# Patient Record
Sex: FEMALE | Race: WHITE | NOT HISPANIC OR LATINO | URBAN - METROPOLITAN AREA
[De-identification: names, ages, dates, MRNs, and addresses within clinical notes are randomized per-mention and may not be internally consistent; named-entity substitution may affect disease eponyms.]

---

## 2019-09-27 ENCOUNTER — EMERGENCY (EMERGENCY)
Facility: HOSPITAL | Age: 74
LOS: 0 days | Discharge: HOME | End: 2019-09-27
Attending: EMERGENCY MEDICINE
Payer: MEDICARE

## 2019-09-27 VITALS
TEMPERATURE: 98 F | HEART RATE: 73 BPM | RESPIRATION RATE: 18 BRPM | SYSTOLIC BLOOD PRESSURE: 172 MMHG | DIASTOLIC BLOOD PRESSURE: 84 MMHG | OXYGEN SATURATION: 96 %

## 2019-09-27 VITALS — HEIGHT: 65 IN | WEIGHT: 141.1 LBS

## 2019-09-27 DIAGNOSIS — S00.511A ABRASION OF LIP, INITIAL ENCOUNTER: ICD-10-CM
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
## 2019-09-27 DIAGNOSIS — S00.81XA ABRASION OF OTHER PART OF HEAD, INITIAL ENCOUNTER: ICD-10-CM

## 2019-09-27 DIAGNOSIS — S80.01XA CONTUSION OF RIGHT KNEE, INITIAL ENCOUNTER: ICD-10-CM

## 2019-09-27 DIAGNOSIS — S02.5XXA FRACTURE OF TOOTH (TRAUMATIC), INITIAL ENCOUNTER FOR CLOSED FRACTURE: ICD-10-CM

## 2019-09-27 DIAGNOSIS — I10 ESSENTIAL (PRIMARY) HYPERTENSION: ICD-10-CM
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
## 2019-09-27 DIAGNOSIS — K21.9 GASTRO-ESOPHAGEAL REFLUX DISEASE WITHOUT ESOPHAGITIS: ICD-10-CM

## 2019-09-27 PROCEDURE — 99283 EMERGENCY DEPT VISIT LOW MDM: CPT

## 2019-09-27 PROCEDURE — 90471 IMMUNIZATION ADMIN: CPT

## 2019-09-27 PROCEDURE — 90715 TDAP VACCINE 7 YRS/> IM: CPT

## 2019-09-27 PROCEDURE — 99283 EMERGENCY DEPT VISIT LOW MDM: CPT | Mod: 25

## 2019-09-27 RX ORDER — IBUPROFEN 200 MG
600 TABLET ORAL ONCE
Refills: 0 | Status: COMPLETED | OUTPATIENT
Start: 2019-09-27 | End: 2019-09-27

## 2019-09-27 RX ORDER — TETANUS TOXOID, REDUCED DIPHTHERIA TOXOID AND ACELLULAR PERTUSSIS VACCINE, ADSORBED 5; 2.5; 8; 8; 2.5 [IU]/.5ML; [IU]/.5ML; UG/.5ML; UG/.5ML; UG/.5ML
0.5 SUSPENSION INTRAMUSCULAR ONCE
Refills: 0 | Status: COMPLETED | OUTPATIENT
Start: 2019-09-27 | End: 2019-09-27

## 2019-09-27 RX ADMIN — TETANUS TOXOID, REDUCED DIPHTHERIA TOXOID AND ACELLULAR PERTUSSIS VACCINE, ADSORBED 0.5 MILLILITER(S): 5; 2.5; 8; 8; 2.5 SUSPENSION INTRAMUSCULAR at 09:37

## 2019-09-27 NOTE — ED PROVIDER NOTE - SKIN, MLM
Skin normal color for race, warm, dry and intact. No evidence of rash. +abrasion to chin. +abrasion to inside of lower lip with swelling. +chip of L front tooth, not exposing pulp. +mild swelling of nose distally. +mild contusion to R knee with FROM and weight bearing.

## 2019-09-27 NOTE — ED ADULT TRIAGE NOTE - CHIEF COMPLAINT QUOTE
Pt. to the ED C/O Fall. Pt. states she tripped and fell and landed on face- Pt. denies head and chest injuries- Pt. also denies LOC - GSC of 15 -Denies Blood thinners and or Aspirin- Abrasions to chin and lower lip noted- Chipped Front Maxillary teeth also noted- Denies medical hx- Pt. does not meet Trauma Alert Criteria at this time-

## 2019-09-27 NOTE — ED ADULT NURSE NOTE - CAS EDN DISCHARGE ASSESSMENT
Alert and oriented to person, place and time/Awake/Patient baseline mental status/No adverse reaction to first time med in ED/Symptoms improved

## 2019-09-27 NOTE — ED PROVIDER NOTE - PATIENT PORTAL LINK FT
You can access the FollowMyHealth Patient Portal offered by Health system by registering at the following website: [email or web address]. By joining [facility]’s FollowMyHealth portal, you will also be able to view your health information using other applications (apps) compatible with our system.

## 2019-09-27 NOTE — ED PROVIDER NOTE - OBJECTIVE STATEMENT
73 y/o F with PMHx of HTN, HLD presenting to the ED s/p fall. States she tripped and fell landing on her face. Pt now in ED with abrasions to lower lip, chin, and chipped maxillary teeth.   Denies LOC, N/V, HA,   Pt is not anticoagulated. 73 y/o F with PMHx of HTN, HLD presenting to the ED s/p fall ~10 minutes PTA. States she was walking when she tripped and fell landing on her face. States she  put her hands out in front of her to catch herself, but does not have any hand pain. Pt now in ED with pain to lower face, abrasions to lower lip, chin, and chipped maxillary teeth. Pt also notes R knee and nose pain. Denies LOC, dizziness, N/V, HA, or neck pain. Unknown Tetanus status. Pt is not anticoagulated.

## 2019-09-27 NOTE — ED PROVIDER NOTE - NSFOLLOWUPINSTRUCTIONS_ED_ALL_ED_FT
Dentist:   Follow up with your own dentist or Dr.Neil Birch [phone]     Fall Prevention    WHAT YOU NEED TO KNOW:    Fall prevention includes ways to make your home and other areas safer. It also includes ways you can move more carefully to prevent a fall. Health conditions that cause changes in your blood pressure, vision, or muscle strength and coordination may increase your risk for falls. Medicines may also increase your risk for falls if they make you dizzy, weak, or sleepy.     DISCHARGE INSTRUCTIONS:    Call 911 or have someone else call if:     You have fallen and are unconscious.      You have fallen and cannot move part of your body.    Contact your healthcare provider if:     You have fallen and have pain or a headache.      You have questions or concerns about your condition or care.    Fall prevention tips:     Stand or sit up slowly. This may help you keep your balance and prevent falls.      Use assistive devices as directed. Your healthcare provider may suggest that you use a cane or walker to help you keep your balance. You may need to have grab bars put in your bathroom near the toilet or in the shower.      Wear shoes that fit well and have soles that . Wear shoes both inside and outside. Use slippers with good . Do not wear shoes with high heels.      Wear a personal alarm. This is a device that allows you to call 911 if you fall and need help. Ask your healthcare provider for more information.      Stay active. Exercise can help strengthen your muscles and improve your balance. Your healthcare provider may recommend water aerobics or walking. He or she may also recommend physical therapy to improve your coordination. Never start an exercise program without talking to your healthcare provider first.       Manage your medical conditions. Keep all appointments with your healthcare providers. Visit your eye doctor as directed.           Home safety tips:     Add items to prevent falls in the bathroom. Put nonslip strips on your bath or shower floor to prevent you from slipping. Use a bath mat if you do not have carpet in the bathroom. This will prevent you from falling when you step out of the bath or shower. Use a shower seat so you do not need to stand while you shower. Sit on the toilet or a chair in your bathroom to dry yourself and put on clothing. This will prevent you from losing your balance from drying or dressing yourself while you are standing.       Keep paths clear. Remove books, shoes, and other objects from walkways and stairs. Place cords for telephones and lamps out of the way so that you do not need to walk over them. Tape them down if you cannot move them. Remove small rugs. If you cannot remove a rug, secure it with double-sided tape. This will prevent you from tripping.       Install bright lights in your home. Use night lights to help light paths to the bathroom or kitchen. Always turn on the light before you start walking.      Keep items you use often on shelves within reach. Do not use a step stool to help you reach an item.      Paint or place reflective tape on the edges of your stairs. This will help you see the stairs better.    Follow up with your healthcare provider as directed: Write down your questions so you remember to ask them during your visits.

## 2019-09-27 NOTE — ED PROVIDER NOTE - CARE PLAN
Principal Discharge DX:	Abrasion of face  Secondary Diagnosis:	Fall  Secondary Diagnosis:	Tooth, broken

## 2019-09-27 NOTE — ED ADULT NURSE NOTE - OBJECTIVE STATEMENT
Pt A & O x 3, VSS, presents to ED s/p mechanical fall from standing height, pt denies LOC, head neck or back pain. Pt c/o lower lip laceration, two front teeth chipped, nasal pain. Abrasion on chin. Nose swollen no discharge. No other obvious bleeding, swelling or deformity. Pt denies blood thinners.

## 2020-10-07 PROBLEM — Z00.00 ENCOUNTER FOR PREVENTIVE HEALTH EXAMINATION: Status: ACTIVE | Noted: 2020-10-07

## 2020-10-08 ENCOUNTER — APPOINTMENT (OUTPATIENT)
Dept: ORTHOPEDIC SURGERY | Facility: CLINIC | Age: 75
End: 2020-10-08
Payer: MEDICARE

## 2020-10-08 VITALS
WEIGHT: 148 LBS | HEART RATE: 64 BPM | HEIGHT: 65 IN | SYSTOLIC BLOOD PRESSURE: 126 MMHG | BODY MASS INDEX: 24.66 KG/M2 | DIASTOLIC BLOOD PRESSURE: 75 MMHG

## 2020-10-08 DIAGNOSIS — Z86.79 PERSONAL HISTORY OF OTHER DISEASES OF THE CIRCULATORY SYSTEM: ICD-10-CM

## 2020-10-08 DIAGNOSIS — M75.42 IMPINGEMENT SYNDROME OF LEFT SHOULDER: ICD-10-CM

## 2020-10-08 DIAGNOSIS — Z78.9 OTHER SPECIFIED HEALTH STATUS: ICD-10-CM

## 2020-10-08 DIAGNOSIS — Z72.89 OTHER PROBLEMS RELATED TO LIFESTYLE: ICD-10-CM

## 2020-10-08 PROCEDURE — 20610 DRAIN/INJ JOINT/BURSA W/O US: CPT | Mod: LT

## 2020-10-08 PROCEDURE — 73030 X-RAY EXAM OF SHOULDER: CPT | Mod: LT

## 2020-10-08 PROCEDURE — 99203 OFFICE O/P NEW LOW 30 MIN: CPT | Mod: 25

## 2020-10-08 RX ORDER — ASCORBIC ACID 500 MG
TABLET ORAL
Refills: 0 | Status: ACTIVE | COMMUNITY

## 2020-10-08 RX ORDER — SIMVASTATIN 20 MG/1
20 TABLET, FILM COATED ORAL
Refills: 0 | Status: ACTIVE | COMMUNITY

## 2020-10-08 RX ORDER — LOSARTAN POTASSIUM 100 MG/1
TABLET, FILM COATED ORAL
Refills: 0 | Status: ACTIVE | COMMUNITY

## 2020-10-08 RX ORDER — UBIDECARENONE/VIT E ACET 100MG-5
CAPSULE ORAL
Refills: 0 | Status: ACTIVE | COMMUNITY

## 2020-10-19 NOTE — DISCUSSION/SUMMARY
[de-identified] : At this time, due to impingement syndrome of the left shoulder, I recommended ice and elevation.  She will be reassessed in three to four weeks.

## 2020-10-19 NOTE — PHYSICAL EXAM
[de-identified] : Right Shoulder: \par Range of Motion in Degrees:   	\par    	                        Claimant:          Normal:  	\par Abduction (Active)  	180  	180 degrees  	\par Abduction (Passive)  	180  	180 degrees  	\par Forward elevation (Active):  	180  	180 degrees  	\par Forward elevation (Passive):  180  	180 degrees  	\par External rotation (Active):  	45  	45 degrees  	\par External rotation (Passive):  	45  	45 degrees  	\par Internal rotation (Active):  	L-1  	L-1  	\par Internal rotation (Passive):  	L-1  	L-1  	\par \par No motor weakness to internal rotation, external rotation or abduction in the scapular plane.  Negative crank test.  Negative O’Juancho’s test.  Negative Speed’s test. Negative Yergason’s test.  Negative cross arm test.  No tenderness to palpation at the AC joint. Negative Hawkin’s sign.  Negative Neer’s sign.  Negative apprehension. Negative sulcus sign.  No gross neurological or vascular deficits distally.  Skin is intact.  No rashes, scars or lesions. 2+ radial and ulnar pulses. No extra-articular swelling or tenderness. \par \par Left Shoulder:  \par Range of Motion in Degrees:	\par 	                                  Claimant:	Normal:	\par Abduction (Active)	                  180	               180 degrees	\par Abduction (Passive)	  180	               180 degrees	\par Forward elevation (Active):	  180	               180 degrees	\par Forward elevation (Passive):	  180	               180 degrees	\par External rotation (Active):	   45	               45 degrees	\par External rotation (Passive):	   45	               45 degrees	\par Internal rotation (Active):	   L-1	               L-1	\par Internal rotation (Passive):	   L-1	               L-1	\par  \par No motor weakness to internal rotation, external rotation or abduction in the scapular plane.  Negative crank test.  Negative O’Juancho’s test.  Negative Speed’s test. Negative Yergason’s test.  Negative cross arm test.  No tenderness to palpation at the AC joint. Positive Hawkin’s sign.  Positive Neer’s sign. Negative apprehension. Negative sulcus sign.  No gross neurological or vascular deficits distally.  Skin is intact.  No rashes, scars or lesions. 2+ radial and ulnar pulses. No extra-articular swelling or tenderness.\par    [de-identified] : Ambulating with a slightly antalgic to antalgic gait.  Station:  Normal.  [de-identified] : Appearance:  Well-developed, well-nourished female in no acute distress.\par   [de-identified] : Radiographs, two views of the left shoulder, show mild degenerative changes.

## 2020-10-19 NOTE — PROCEDURE
[de-identified] : Consent: \par At this time, I have recommended an injection to the left shoulder.  The risks and benefits of the procedure were discussed with the patient in detail.  Upon verbal consent of the patient, we proceeded with the injection as noted below.  \par \par Procedure:  \par After a sterile prep, the patient underwent an injection of 9 cc of 1% Lidocaine without epinephrine and 1 cc of Kenalog into the left shoulder.  The patient tolerated the procedure well.  There were no complications.  \par

## 2020-10-19 NOTE — ADDENDUM
[FreeTextEntry1] : This note was written by Brandi Murphy on 10/13/2020 acting as a scribe for CHARITO CORDERO III, MD

## 2020-10-19 NOTE — HISTORY OF PRESENT ILLNESS
[4] : the ailment interference is 4/10 [de-identified] : The patient comes in today with complaints of pain to her left shoulder.  It has been going on for the last two to three weeks, getting worse recently, especially with overhead activities. This injury is not work related or due to an automobile accident.  The patient states the pain is intermittent.  The patient describes the pain as dull.  The patient notes not lifting the arm makes her symptoms better, while moving the arm makes her symptoms worse.  The patient indicates a pain level of 7 on a pain scale of 0-10.  [] : No

## 2020-10-29 ENCOUNTER — APPOINTMENT (OUTPATIENT)
Dept: ORTHOPEDIC SURGERY | Facility: CLINIC | Age: 75
End: 2020-10-29

## 2021-01-14 ENCOUNTER — OUTPATIENT (OUTPATIENT)
Dept: OUTPATIENT SERVICES | Facility: HOSPITAL | Age: 76
LOS: 1 days | End: 2021-01-14
Payer: MEDICARE

## 2021-01-14 DIAGNOSIS — Z20.828 CONTACT WITH AND (SUSPECTED) EXPOSURE TO OTHER VIRAL COMMUNICABLE DISEASES: ICD-10-CM

## 2021-01-14 PROCEDURE — U0005: CPT

## 2021-01-14 PROCEDURE — U0003: CPT

## 2021-01-14 PROCEDURE — C9803: CPT

## 2021-01-15 DIAGNOSIS — Z20.828 CONTACT WITH AND (SUSPECTED) EXPOSURE TO OTHER VIRAL COMMUNICABLE DISEASES: ICD-10-CM

## 2021-01-15 LAB — SARS-COV-2 RNA SPEC QL NAA+PROBE: SIGNIFICANT CHANGE UP

## 2021-10-03 ENCOUNTER — TRANSCRIPTION ENCOUNTER (OUTPATIENT)
Age: 76
End: 2021-10-03

## 2022-03-16 ENCOUNTER — APPOINTMENT (OUTPATIENT)
Dept: GASTROENTEROLOGY | Facility: CLINIC | Age: 77
End: 2022-03-16
Payer: MEDICARE

## 2022-03-16 VITALS — HEIGHT: 65 IN | BODY MASS INDEX: 23.66 KG/M2 | WEIGHT: 142 LBS

## 2022-03-16 DIAGNOSIS — Z12.11 ENCOUNTER FOR SCREENING FOR MALIGNANT NEOPLASM OF COLON: ICD-10-CM

## 2022-03-16 PROCEDURE — 99203 OFFICE O/P NEW LOW 30 MIN: CPT

## 2022-03-16 RX ORDER — SODIUM PICOSULFATE, MAGNESIUM OXIDE, AND ANHYDROUS CITRIC ACID 10; 3.5; 12 MG/160ML; G/160ML; G/160ML
10-3.5-12 MG-GM LIQUID ORAL
Qty: 1 | Refills: 0 | Status: ACTIVE | COMMUNITY
Start: 2022-03-16 | End: 1900-01-01

## 2022-03-16 NOTE — HISTORY OF PRESENT ILLNESS
[de-identified] : Ms. LEANDER MOREJON is a 76 year old female presents for screening colonoscopy. Patient has no complaints of bowel issues, bleeding, abdominal pain, family history of colon cancer, GERD symptoms. Patient had last colonoscopy five years ago. No significant changes in medical history. \par \par

## 2022-05-10 ENCOUNTER — APPOINTMENT (OUTPATIENT)
Dept: GASTROENTEROLOGY | Facility: AMBULATORY MEDICAL SERVICES | Age: 77
End: 2022-05-10
Payer: MEDICARE

## 2022-05-10 PROCEDURE — 45378 DIAGNOSTIC COLONOSCOPY: CPT

## 2022-10-10 ENCOUNTER — APPOINTMENT (OUTPATIENT)
Dept: ORTHOPEDIC SURGERY | Facility: CLINIC | Age: 77
End: 2022-10-10

## 2022-10-10 ENCOUNTER — NON-APPOINTMENT (OUTPATIENT)
Age: 77
End: 2022-10-10

## 2022-10-10 PROCEDURE — 28470 CLTX METATARSAL FX WO MNP EA: CPT | Mod: T9

## 2022-10-10 PROCEDURE — 99214 OFFICE O/P EST MOD 30 MIN: CPT | Mod: 57

## 2022-10-10 PROCEDURE — 73630 X-RAY EXAM OF FOOT: CPT | Mod: RT

## 2022-10-10 NOTE — PHYSICAL EXAM
[de-identified] : General: Alert and oriented x3. In no acute distress. Pleasant in nature with a normal affect. No apparent respiratory distress.\par \par Right Foot Exam\par Skin: Clean, dry, intact\par Inspection: No obvious malalignment, no masses, + lateral foot swelling, no effusion\par Pulses: 2+ DP/PT pulses\par ROM: FOOT Full  ROM of digits, ANKLE 10 degrees of dorsiflexion, 40 degrees of plantarflexion, 10 degrees of subtalar motion.\par Painful ROM: None\par Tenderness: No tenderness over the medial malleolus, No tenderness over the lateral malleolus, no CFL/ATFL/PTFL pain, no deltoid ligament pain. No heel pain. No Achilles tenderness. + 5th metatarsal pain. No pain to the LisFranc joint. No ttp over the posterior tibial tendon.\par Stability: Negative anterior/posterior drawer.\par Strength: 5/5 ADD/ABD/TA/GS/EHL/FHL/EDL\par Neuro: Sensation in tact to light touch throughout\par Additional tests: Negative Mortons test, negative tarsal tunnel tinels, negative single heel rise.			 [de-identified] : 3V of the right foot were ordered, obtained, and reviewed by me today, 10/10/2022, revealed: Displaced fracture of the right foot fifth metatarsal. \par

## 2022-10-10 NOTE — ADDENDUM
[FreeTextEntry1] : I, Ashley De Los Santos, acted solely as a scribe for Dr. Denzel Mccartney on this date 10/10/2022.\par \par All medical record entries made by the Scribe were at my, Dr. Denzel Mccartney, direction and personally dictated by me on 10/10/2022. I have reviewed the chart and agree that the record accurately reflects my personal performance of the history, physical exam, assessment and plan. I have also personally directed, reviewed, and agreed with the chart.	\par

## 2022-10-10 NOTE — DISCUSSION/SUMMARY
[de-identified] : Today I had a lengthy discussion with the patient regarding their right foot fifth metatarsal fracture. I have addressed all the patient's concerns surrounding the pathology of their condition. XR imaging was completed in office today and results were reviewed with the patient. I recommend that the patient utilize a stiff shoe. The patient was provided with the stiff shoe in the office today. I advised the patient to utilize 81 mg of Aspirin as instructed for blood thinning purposes. I recommend that the patient utilize ice, NSAIDs, and heat PRN. They can also elevate their right foot above the level of the heart. The patient understood and verbally agreed to the treatment plan. All of their questions were answered and they were satisfied with the visit. The patient should call the office if they have any questions or experience worsening symptoms. I would like to see the patient back in the office in 1 month to reassess their condition. 				\par

## 2022-10-10 NOTE — HISTORY OF PRESENT ILLNESS
[FreeTextEntry1] : 10/10/2022: The patient is a 77 year old female presenting for an initial evaluation of right foot injury sustained earlier today. The patient reports that her right foot rolled while speed walking over a rock. She reports swelling and pain immediately post injury over the dorsolateral foot. The pain is reported to be worsened with standing and weightbearing. She denies any ankle pain. She presents wearing flat slip on sneakers and is walking without assistance. No other complaints.

## 2022-11-07 ENCOUNTER — APPOINTMENT (OUTPATIENT)
Dept: ORTHOPEDIC SURGERY | Facility: CLINIC | Age: 77
End: 2022-11-07

## 2022-11-07 DIAGNOSIS — S92.351A DISPLACED FRACTURE OF FIFTH METATARSAL BONE, RIGHT FOOT, INITIAL ENCOUNTER FOR CLOSED FRACTURE: ICD-10-CM

## 2022-11-07 PROCEDURE — 99024 POSTOP FOLLOW-UP VISIT: CPT

## 2022-11-07 PROCEDURE — 73630 X-RAY EXAM OF FOOT: CPT | Mod: RT

## 2022-11-07 NOTE — HISTORY OF PRESENT ILLNESS
[FreeTextEntry1] : 11/7/22: The patient is here for follow-up of her right the patient is here for follow-up of her right foot fifth metatarsal fracture.  The patient has minimal discomfort and much improved discomfort right foot.  She is walking without assistance.  Her pain scale is 1 out of 10.  No other complaints.\par \par 10/10/2022: The patient is a 77 year old female presenting for an initial evaluation of right foot injury sustained earlier today. The patient reports that her right foot rolled while speed walking over a rock. She reports swelling and pain immediately post injury over the dorsolateral foot. The pain is reported to be worsened with standing and weightbearing. She denies any ankle pain. She presents wearing flat slip on sneakers and is walking without assistance. No other complaints.

## 2022-11-07 NOTE — PHYSICAL EXAM
[de-identified] : General: Alert and oriented x3. In no acute distress. Pleasant in nature with a normal affect. No apparent respiratory distress.\par \par Right Foot Exam\par Skin: Clean, dry, intact\par Inspection: No obvious malalignment, no masses, no lateral foot swelling, no effusion\par Pulses: 2+ DP/PT pulses\par ROM: FOOT Full  ROM of digits, ANKLE 10 degrees of dorsiflexion, 40 degrees of plantarflexion, 10 degrees of subtalar motion.\par Painful ROM: None\par Tenderness: No tenderness over the medial malleolus, No tenderness over the lateral malleolus, no CFL/ATFL/PTFL pain, no deltoid ligament pain. No heel pain. No Achilles tenderness. + Improved and very mild base of 5th metatarsal pain. No pain to the LisFranc joint. No ttp over the posterior tibial tendon.\par Stability: Negative anterior/posterior drawer.\par Strength: 5/5 ADD/ABD/TA/GS/EHL/FHL/EDL\par Neuro: Sensation in tact to light touch throughout\par Additional tests: Negative Mortons test, negative tarsal tunnel tinels, negative single heel rise.			 [de-identified] : 3V of the right foot were ordered, obtained, and reviewed by me today, 11/7/2022, revealed: Displaced fracture of the right foot fifth metatarsal. \par

## 2022-11-07 NOTE — DISCUSSION/SUMMARY
[de-identified] : The patient has minimal to no pain over the fracture.  She can now transition into a regular sneaker and fully weight-bear.  She asked about exercising, I want her to continue a low impact exercise for the next 2 weeks and she can slowly increase her activities and exercise routine as long as she does not have pain in the foot.  I showed her the x-rays and the fracture is healed.  I explained to her that this fracture can take up to 6 to 8 weeks total fracture healing time.  All of her questions were answered.  She understood the treatment course.  She could follow-up on an as-needed basis.

## 2023-04-04 ENCOUNTER — OFFICE (OUTPATIENT)
Dept: URBAN - METROPOLITAN AREA CLINIC 102 | Facility: CLINIC | Age: 78
Setting detail: OPHTHALMOLOGY
End: 2023-04-04
Payer: MEDICARE

## 2023-04-04 DIAGNOSIS — H26.491: ICD-10-CM

## 2023-04-04 DIAGNOSIS — H25.12: ICD-10-CM

## 2023-04-04 DIAGNOSIS — Z96.1: ICD-10-CM

## 2023-04-04 DIAGNOSIS — H40.032: ICD-10-CM

## 2023-04-04 PROCEDURE — 92014 COMPRE OPH EXAM EST PT 1/>: CPT | Performed by: OPHTHALMOLOGY

## 2023-04-04 PROCEDURE — 92020 GONIOSCOPY: CPT | Performed by: OPHTHALMOLOGY

## 2023-04-04 ASSESSMENT — AXIALLENGTH_DERIVED
OS_AL: 23.5378
OD_AL: 23.5433
OS_AL: 24.2881
OS_AL: 24.1853
OD_AL: 23.7389
OD_AL: 23.5433
OD_AL: 23.7883
OS_AL: 23.5378

## 2023-04-04 ASSESSMENT — REFRACTION_MANIFEST
OD_ADD: +2.50
OS_VA1: 20/20
OD_CYLINDER: -0.75
OS_CYLINDER: -1.50
OD_SPHERE: +0.50
OD_CYLINDER: -0.75
OD_VA1: 20/20
OS_VA2: 20/20
OD_AXIS: 95
OD_SPHERE: +0.50
OS_CYLINDER: -0.75
OD_VA1: 20/20
OU_VA: 20/20
OS_CYLINDER: -0.75
OS_ADD: +2.50
OD_AXIS: 086
OS_VA2: 20/20
OS_SPHERE: +0.75
OS_SPHERE: -0.50
OD_AXIS: 95
OS_SPHERE: +0.75
OD_VA2: 20/20
OS_VA1: 20/20
OS_AXIS: 092
OD_ADD: +2.50
OS_AXIS: 105
OD_SPHERE: -0.25
OS_VA1: 20/25-1
OS_ADD: +2.50
OS_AXIS: 105
OD_CYLINDER: -0.25
OD_VA2: 20/20
OD_VA1: 20/20

## 2023-04-04 ASSESSMENT — SPHEQUIV_DERIVED
OS_SPHEQUIV: 0.375
OS_SPHEQUIV: -1.5
OD_SPHEQUIV: -0.5
OS_SPHEQUIV: 0.375
OD_SPHEQUIV: 0.125
OD_SPHEQUIV: -0.375
OD_SPHEQUIV: 0.125
OS_SPHEQUIV: -1.25

## 2023-04-04 ASSESSMENT — KERATOMETRY
OS_K1POWER_DIOPTERS: 43.00
OS_K2POWER_DIOPTERS: 43.50
OS_AXISANGLE_DEGREES: 036
OD_K2POWER_DIOPTERS: 43.75
METHOD_AUTO_MANUAL: AUTO
OD_K1POWER_DIOPTERS: 43.25
OD_AXISANGLE_DEGREES: 161

## 2023-04-04 ASSESSMENT — CONFRONTATIONAL VISUAL FIELD TEST (CVF)
OS_FINDINGS: FULL
OD_FINDINGS: FULL

## 2023-04-04 ASSESSMENT — VISUAL ACUITY
OS_BCVA: 20/20-
OD_BCVA: 20/40

## 2023-04-04 ASSESSMENT — REFRACTION_CURRENTRX
OS_SPHERE: +3.25
OD_AXIS: 097
OD_CYLINDER: -0.75
OS_OVR_VA: 20/
OD_VPRISM_DIRECTION: SV
OS_VPRISM_DIRECTION: SV
OS_AXIS: 107
OS_CYLINDER: -0.75
OD_SPHERE: +3.00
OD_OVR_VA: 20/

## 2023-04-04 ASSESSMENT — REFRACTION_AUTOREFRACTION
OD_AXIS: 092
OS_CYLINDER: -1.50
OD_SPHERE: 0.00
OD_CYLINDER: -1.00
OS_AXIS: 097
OS_SPHERE: -0.75

## 2023-04-04 ASSESSMENT — TONOMETRY
OS_IOP_MMHG: 14
OD_IOP_MMHG: 13

## 2023-06-28 PROBLEM — H53.023 AMBLYOPIA REFRACTIVE; BOTH EYES: Status: ACTIVE | Noted: 2023-06-28

## 2023-08-17 ENCOUNTER — NON-APPOINTMENT (OUTPATIENT)
Age: 78
End: 2023-08-17

## 2023-08-29 ENCOUNTER — NON-APPOINTMENT (OUTPATIENT)
Age: 78
End: 2023-08-29

## 2023-11-07 ENCOUNTER — OFFICE (OUTPATIENT)
Dept: URBAN - METROPOLITAN AREA CLINIC 102 | Facility: CLINIC | Age: 78
Setting detail: OPHTHALMOLOGY
End: 2023-11-07
Payer: MEDICARE

## 2023-11-07 DIAGNOSIS — H40.032: ICD-10-CM

## 2023-11-07 DIAGNOSIS — Z96.1: ICD-10-CM

## 2023-11-07 DIAGNOSIS — H26.491: ICD-10-CM

## 2023-11-07 DIAGNOSIS — H25.12: ICD-10-CM

## 2023-11-07 PROCEDURE — 92020 GONIOSCOPY: CPT | Performed by: OPHTHALMOLOGY

## 2023-11-07 PROCEDURE — 99214 OFFICE O/P EST MOD 30 MIN: CPT | Performed by: OPHTHALMOLOGY

## 2023-11-07 ASSESSMENT — SPHEQUIV_DERIVED
OS_SPHEQUIV: -1.875
OS_SPHEQUIV: 0.375
OD_SPHEQUIV: 0.125
OD_SPHEQUIV: -0.5
OS_SPHEQUIV: 0.375
OD_SPHEQUIV: 0.125

## 2023-11-07 ASSESSMENT — CONFRONTATIONAL VISUAL FIELD TEST (CVF)
OS_FINDINGS: FULL
OD_FINDINGS: FULL

## 2023-11-07 ASSESSMENT — REFRACTION_CURRENTRX
OS_AXIS: 107
OS_VPRISM_DIRECTION: SV
OS_OVR_VA: 20/
OS_SPHERE: +3.25
OD_AXIS: 097
OD_CYLINDER: -0.75
OS_CYLINDER: -0.75
OD_VPRISM_DIRECTION: SV
OD_SPHERE: +3.00
OD_OVR_VA: 20/

## 2023-11-07 ASSESSMENT — REFRACTION_MANIFEST
OD_AXIS: 95
OS_SPHERE: +0.75
OS_AXIS: 105
OD_CYLINDER: -0.75
OS_VA2: 20/20
OD_ADD: +2.50
OS_VA2: 20/20
OD_VA2: 20/20
OS_SPHERE: +0.75
OD_AXIS: 95
OS_VA1: 20/20
OD_ADD: +2.50
OS_ADD: +2.50
OD_VA1: 20/20
OD_VA1: 20/20
OS_AXIS: 105
OD_SPHERE: +0.50
OD_SPHERE: +0.50
OS_VA1: 20/20
OD_VA2: 20/20
OU_VA: 20/20
OS_CYLINDER: -0.75
OS_ADD: +2.50
OD_CYLINDER: -0.75
OS_CYLINDER: -0.75

## 2023-11-07 ASSESSMENT — REFRACTION_AUTOREFRACTION
OS_AXIS: 098
OS_CYLINDER: -1.25
OD_AXIS: 098
OD_CYLINDER: -1.50
OD_SPHERE: +0.25
OS_SPHERE: -1.25

## 2024-05-07 ENCOUNTER — OFFICE (OUTPATIENT)
Dept: URBAN - METROPOLITAN AREA CLINIC 102 | Facility: CLINIC | Age: 79
Setting detail: OPHTHALMOLOGY
End: 2024-05-07
Payer: MEDICARE

## 2024-05-07 DIAGNOSIS — H26.491: ICD-10-CM

## 2024-05-07 DIAGNOSIS — H40.032: ICD-10-CM

## 2024-05-07 DIAGNOSIS — H25.12: ICD-10-CM

## 2024-05-07 DIAGNOSIS — Z96.1: ICD-10-CM

## 2024-05-07 PROCEDURE — 92020 GONIOSCOPY: CPT | Performed by: OPHTHALMOLOGY

## 2024-05-07 PROCEDURE — 92014 COMPRE OPH EXAM EST PT 1/>: CPT | Performed by: OPHTHALMOLOGY

## 2024-05-07 ASSESSMENT — CONFRONTATIONAL VISUAL FIELD TEST (CVF)
OD_FINDINGS: FULL
OS_FINDINGS: FULL

## 2024-07-02 ENCOUNTER — NON-APPOINTMENT (OUTPATIENT)
Age: 79
End: 2024-07-02

## 2024-07-03 ENCOUNTER — NON-APPOINTMENT (OUTPATIENT)
Age: 79
End: 2024-07-03

## 2024-11-08 ENCOUNTER — OFFICE (OUTPATIENT)
Dept: URBAN - METROPOLITAN AREA CLINIC 102 | Facility: CLINIC | Age: 79
Setting detail: OPHTHALMOLOGY
End: 2024-11-08
Payer: MEDICARE

## 2024-11-08 DIAGNOSIS — H26.491: ICD-10-CM

## 2024-11-08 DIAGNOSIS — H25.12: ICD-10-CM

## 2024-11-08 DIAGNOSIS — Z96.1: ICD-10-CM

## 2024-11-08 DIAGNOSIS — H40.032: ICD-10-CM

## 2024-11-08 PROCEDURE — 92014 COMPRE OPH EXAM EST PT 1/>: CPT | Performed by: OPHTHALMOLOGY

## 2024-11-08 PROCEDURE — 92020 GONIOSCOPY: CPT | Performed by: OPHTHALMOLOGY

## 2024-11-08 ASSESSMENT — REFRACTION_MANIFEST
OD_AXIS: 95
OS_SPHERE: +0.75
OD_CYLINDER: -0.75
OS_VA2: 20/20
OS_AXIS: 105
OD_ADD: +2.50
OS_SPHERE: +0.75
OU_VA: 20/20
OD_AXIS: 95
OD_VA1: 20/20
OS_ADD: +2.50
OD_CYLINDER: -0.75
OS_VA2: 20/20
OD_SPHERE: +0.50
OS_CYLINDER: -0.75
OS_AXIS: 105
OS_ADD: +2.50
OD_VA2: 20/20
OS_VA1: 20/20
OS_VA1: 20/20
OD_ADD: +2.50
OD_VA1: 20/20
OD_SPHERE: +0.50
OD_VA2: 20/20
OS_CYLINDER: -0.75

## 2024-11-08 ASSESSMENT — VISUAL ACUITY
OS_BCVA: 20/25
OD_BCVA: 20/60

## 2024-11-08 ASSESSMENT — KERATOMETRY
OS_AXISANGLE_DEGREES: 33
OD_K1POWER_DIOPTERS: 43.50
METHOD_AUTO_MANUAL: AUTO
OS_K2POWER_DIOPTERS: 43.75
OD_AXISANGLE_DEGREES: 008
OS_K1POWER_DIOPTERS: 43.00
OD_K2POWER_DIOPTERS: 43.75

## 2024-11-08 ASSESSMENT — REFRACTION_AUTOREFRACTION
OS_AXIS: 97
OS_CYLINDER: -1.50
OD_AXIS: 101
OD_SPHERE: -0.25
OS_SPHERE: -1.75
OD_CYLINDER: -1.00

## 2024-11-08 ASSESSMENT — CONFRONTATIONAL VISUAL FIELD TEST (CVF)
OD_FINDINGS: FULL
OS_FINDINGS: FULL

## 2024-11-08 ASSESSMENT — TONOMETRY
OS_IOP_MMHG: 14
OD_IOP_MMHG: 14

## 2024-11-08 ASSESSMENT — REFRACTION_CURRENTRX
OD_SPHERE: +3.00
OS_SPHERE: +3.25
OD_AXIS: 097
OD_CYLINDER: -0.75
OD_OVR_VA: 20/
OS_CYLINDER: -0.75
OS_AXIS: 107
OS_VPRISM_DIRECTION: SV
OD_VPRISM_DIRECTION: SV
OS_OVR_VA: 20/

## 2025-01-21 ENCOUNTER — NON-APPOINTMENT (OUTPATIENT)
Age: 80
End: 2025-01-21

## 2025-01-22 ENCOUNTER — APPOINTMENT (OUTPATIENT)
Dept: ORTHOPEDIC SURGERY | Facility: CLINIC | Age: 80
End: 2025-01-22

## 2025-01-22 VITALS
BODY MASS INDEX: 22.99 KG/M2 | HEIGHT: 65 IN | HEART RATE: 52 BPM | SYSTOLIC BLOOD PRESSURE: 166 MMHG | WEIGHT: 138 LBS | DIASTOLIC BLOOD PRESSURE: 81 MMHG

## 2025-01-22 DIAGNOSIS — Z86.79 PERSONAL HISTORY OF OTHER DISEASES OF THE CIRCULATORY SYSTEM: ICD-10-CM

## 2025-01-22 DIAGNOSIS — Z86.39 PERSONAL HISTORY OF OTHER ENDOCRINE, NUTRITIONAL AND METABOLIC DISEASE: ICD-10-CM

## 2025-01-22 DIAGNOSIS — M75.32 CALCIFIC TENDINITIS OF LEFT SHOULDER: ICD-10-CM

## 2025-01-22 DIAGNOSIS — E87.1 HYPO-OSMOLALITY AND HYPONATREMIA: ICD-10-CM

## 2025-01-22 PROCEDURE — 99214 OFFICE O/P EST MOD 30 MIN: CPT | Mod: 25

## 2025-01-22 PROCEDURE — 73030 X-RAY EXAM OF SHOULDER: CPT | Mod: LT

## 2025-01-22 PROCEDURE — 20610 DRAIN/INJ JOINT/BURSA W/O US: CPT | Mod: LT

## 2025-01-23 PROBLEM — M75.32 CALCIFIC TENDINITIS OF LEFT SHOULDER: Status: ACTIVE | Noted: 2025-01-22

## 2025-01-24 PROBLEM — E87.1 HYPONATREMIA: Status: RESOLVED | Noted: 2025-01-24 | Resolved: 2025-01-24

## 2025-01-24 PROBLEM — Z86.79 HISTORY OF HYPERTENSION: Status: RESOLVED | Noted: 2025-01-24 | Resolved: 2025-01-24

## 2025-01-24 PROBLEM — Z86.39 HISTORY OF HYPERKALEMIA: Status: RESOLVED | Noted: 2025-01-24 | Resolved: 2025-01-24

## 2025-02-12 ENCOUNTER — APPOINTMENT (OUTPATIENT)
Dept: ORTHOPEDIC SURGERY | Facility: CLINIC | Age: 80
End: 2025-02-12

## 2025-05-12 ENCOUNTER — OFFICE (OUTPATIENT)
Dept: URBAN - METROPOLITAN AREA CLINIC 102 | Facility: CLINIC | Age: 80
Setting detail: OPHTHALMOLOGY
End: 2025-05-12
Payer: MEDICARE

## 2025-05-12 DIAGNOSIS — H26.491: ICD-10-CM

## 2025-05-12 DIAGNOSIS — H25.12: ICD-10-CM

## 2025-05-12 DIAGNOSIS — H40.032: ICD-10-CM

## 2025-05-12 DIAGNOSIS — Z96.1: ICD-10-CM

## 2025-05-12 PROCEDURE — 92020 GONIOSCOPY: CPT | Performed by: OPHTHALMOLOGY

## 2025-05-12 PROCEDURE — 92014 COMPRE OPH EXAM EST PT 1/>: CPT | Performed by: OPHTHALMOLOGY

## 2025-05-12 ASSESSMENT — REFRACTION_CURRENTRX
OS_CYLINDER: -0.75
OD_VPRISM_DIRECTION: SV
OD_CYLINDER: -0.75
OD_AXIS: 097
OD_OVR_VA: 20/
OS_AXIS: 107
OS_VPRISM_DIRECTION: SV
OD_SPHERE: +3.00
OS_OVR_VA: 20/
OS_SPHERE: +3.25

## 2025-05-12 ASSESSMENT — REFRACTION_MANIFEST
OS_VA2: 20/20
OS_VA2: 20/20
OD_CYLINDER: -0.75
OD_ADD: +2.50
OD_VA1: 20/20
OD_ADD: +2.50
OD_AXIS: 95
OD_AXIS: 95
OD_SPHERE: +0.50
OD_CYLINDER: -0.75
OS_SPHERE: +0.75
OS_SPHERE: +0.75
OS_CYLINDER: -0.75
OD_VA1: 20/20
OS_AXIS: 105
OS_ADD: +2.50
OD_VA2: 20/20
OS_VA1: 20/20
OS_ADD: +2.50
OS_AXIS: 105
OS_VA1: 20/20
OD_VA2: 20/20
OS_CYLINDER: -0.75
OD_SPHERE: +0.50
OU_VA: 20/20

## 2025-05-12 ASSESSMENT — KERATOMETRY
OD_K2POWER_DIOPTERS: 44.00
OS_K2POWER_DIOPTERS: 43.75
OD_AXISANGLE_DEGREES: 025
OD_K1POWER_DIOPTERS: 43.50
OS_K1POWER_DIOPTERS: 42.75
METHOD_AUTO_MANUAL: AUTO
OS_AXISANGLE_DEGREES: 014

## 2025-05-12 ASSESSMENT — REFRACTION_AUTOREFRACTION
OS_SPHERE: -1.25
OS_AXIS: 098
OD_CYLINDER: -1.00
OD_AXIS: 110
OD_SPHERE: 0.00
OS_CYLINDER: -1.50

## 2025-05-12 ASSESSMENT — TONOMETRY
OS_IOP_MMHG: 17
OS_IOP_MMHG: 13
OD_IOP_MMHG: 11
OD_IOP_MMHG: 15

## 2025-05-12 ASSESSMENT — VISUAL ACUITY
OS_BCVA: 20/20-2
OD_BCVA: 20/40-1

## 2025-05-12 ASSESSMENT — CONFRONTATIONAL VISUAL FIELD TEST (CVF)
OS_FINDINGS: FULL
OD_FINDINGS: FULL

## 2025-08-06 ENCOUNTER — NON-APPOINTMENT (OUTPATIENT)
Age: 80
End: 2025-08-06